# Patient Record
Sex: MALE | Race: WHITE | NOT HISPANIC OR LATINO | Employment: OTHER | URBAN - METROPOLITAN AREA
[De-identification: names, ages, dates, MRNs, and addresses within clinical notes are randomized per-mention and may not be internally consistent; named-entity substitution may affect disease eponyms.]

---

## 2023-10-10 ENCOUNTER — NURSING HOME VISIT (OUTPATIENT)
Dept: WOUND CARE | Facility: HOSPITAL | Age: 53
End: 2023-10-10
Payer: MEDICARE

## 2023-10-10 DIAGNOSIS — K81.9 CHOLECYSTITIS: Primary | ICD-10-CM

## 2023-10-10 PROCEDURE — 99304 1ST NF CARE SF/LOW MDM 25: CPT | Performed by: NURSE PRACTITIONER

## 2023-10-10 NOTE — PROGRESS NOTES
[facility]   [facility] [place]       Patient ID: Lorenzo Rosario is a 48 y.o. male Date of Birth 1970     Location of Service: [address] [place] [address] rehab    Performed wound round with: Wound team     Chief Complaint : Right abdomen    Wound Instructions:  Cholecystostomy  tube insertion site  Cleansed with no saline solution  Cover insertion site with split gauze  Daily and as needed for soiling  Monitor for any sign of infection    Allergies  Patient has no allergy information on record. Assessment & Plan:  1. Cholecystitis  Assessment & Plan: With cholecystostomy tube placement on 914 at the right upper quadrant. Incision site -no redness or obvious sign of infection  Continue split gauze  Continue to follow-up with surgeon  Sign off. Facility staff will continue to provide treatment and monitor the wound. Can reconsult wound nurse practitioner if wound failed to heal or worsened. Subjective:   October 10, 2023. New consult for surgical incision. Patient was referred by geriatric team.  Patient have a complex medical history including but not limited to hypertension, history of lumbar decompression and fusion and spastic left hemiparesis. Patient was seen with the facility wound team.    Wound history: As per medical record review, he was recently admitted on admitted 9/10 with increased weakness, falls, difficulty emptying his bladder. He then had a CT abdomen and pelvis which showed "Significant gallbladder distention, with gallbladder wall thickening and pericholecystic edema;  suspect fistulous connection between the gallbladder and the 2nd segment duodenum,  with few small calcified gallstones along the region of the gallbladder neck" felt  consistent with acute cholecystitis. Surgery was consulted who recommended RUQ US and treatment with percutaneous cholecystostomy tube placement by IR (placed 9/14) for given degree of  inflammation.   He completed course of IV Cipro from 9/10 - 9/18 and IV Flagyl 9/14 - 9/18. Received in bed, seems comfortable. Denies pain. No significant issues related to the surgical incision. Review of Systems   Constitutional: Negative. Respiratory: Negative. Cardiovascular: Negative. Skin: Positive for wound. Objective:    Physical Exam  Constitutional:       Appearance: Normal appearance. Cardiovascular:      Rate and Rhythm: Normal rate. Pulmonary:      Effort: Pulmonary effort is normal.   Skin:     Comments: Cholecystostomy tube on the right upper quadrant abdomen, secured, insertion site (negative redness),    Neurological:      Mental Status: He is alert. Procedures           Patient's care was coordinated with nursing facility staff. Recent vitals, labs and updated medications were reviewed on EMR or chart system of facility. Past Medical, surgical, social, medication and allergy history and patient's previous records were reviewed and updated as appropriate: Most up-to date information is available in the facility EMR where the patient is currently admitted. Past Medical History:  Diagnosis Date  • Allergic  • Ankle fracture, left 8/31/2016  Past Surgical History  Past Family History  Non-contributory to current clinical situation  Social History  Tobacco: none  Alcohol: none  Drugs: none  lives alone in a 1st floo         Coordination of Care: Wound team aware of the treatment plan. Facility nurse will provide wound treatment and monitor the wound for any changes. Patient / Staff education : Patient / Staff was given education on sign of infection and pressure ulcer prevention. Patient/ Staff verbalized understanding     Follow up :  Sign off  Voice-recognition software may have been used in the preparation of this document. Occasional wrong word or "sound-alike" substitutions may have occurred due to the inherent limitations of voice recognition software.  Interpretation should be guided by context.       GRACIE Dinh Che

## 2023-10-10 NOTE — ASSESSMENT & PLAN NOTE
With cholecystostomy tube placement on 914 at the right upper quadrant. Incision site -no redness or obvious sign of infection  Continue split gauze  Continue to follow-up with surgeon  Sign off. Facility staff will continue to provide treatment and monitor the wound. Can reconsult wound nurse practitioner if wound failed to heal or worsened.

## 2024-02-13 ENCOUNTER — NURSING HOME VISIT (OUTPATIENT)
Dept: WOUND CARE | Facility: HOSPITAL | Age: 54
End: 2024-02-13
Payer: MEDICARE

## 2024-02-13 DIAGNOSIS — R26.2 AMBULATORY DYSFUNCTION: ICD-10-CM

## 2024-02-13 DIAGNOSIS — T14.8XXA SURGICAL WOUND PRESENT: Primary | ICD-10-CM

## 2024-02-13 PROCEDURE — 99308 SBSQ NF CARE LOW MDM 20: CPT | Performed by: NURSE PRACTITIONER

## 2024-02-14 PROBLEM — T14.8XXA SURGICAL WOUND PRESENT: Status: ACTIVE | Noted: 2024-02-14

## 2024-02-14 NOTE — ASSESSMENT & PLAN NOTE
S/p  re-exploration of laparotomy, ileocolic anastomosis, fascial closure and PRACHI drain placement on 12/7.  -100% granulation, with no sign of infection  Local wound care with collagen  Continue to follow-up with surgeon  Follow-up next week

## 2024-02-14 NOTE — PROGRESS NOTES
"  [facility] WOUND CARE MANAGEMENT   AND HYPERBARIC MEDICINE CENTER       Patient ID: Ron Courtney is a 53 y.o. male Date of Birth 1970     [facility]    Performed wound round with: Wound team     Chief Complaint : abdomen    Wound Instructions:  Wound:  Abdomen  Discontinue previous wound order  Cleanse the wound bed with NSS   Apply non-sting skin prep to periwound area  Apply collagen wound dressing to wound bed, then cover with bordered foam  Frequency : daily  and prn for soiling  Offload all wounds  Turn and reposition frequently  Instruct / Assist with weight shifting in wheelchair  Increase protein intake.  Monitor for any sign of infection or worsening, inform PCP or patient's primary physician in your facility.      Allergies  Patient has no allergy information on record.      Assessment & Plan:  1. Surgical wound present  Assessment & Plan:  S/p  re-exploration of laparotomy, ileocolic anastomosis, fascial closure and PRACHI drain placement on 12/7.  -100% granulation, with no sign of infection  Local wound care with collagen  Continue to follow-up with surgeon  Follow-up next week        2. Ambulatory dysfunction               Subjective:   October 10, 2023.  New consult for surgical incision.  Patient was referred by geriatric team.  Patient have a complex medical history including but not limited to hypertension, history of lumbar decompression and fusion and spastic left hemiparesis.  Patient was seen with the facility wound team.    Wound history: As per medical record review, he was recently admitted on admitted 9/10 with increased weakness, falls, difficulty emptying his bladder.He then had a CT abdomen and pelvis which showed \"Significant gallbladder distention, with gallbladder wall thickening and pericholecystic edema;  suspect fistulous connection between the gallbladder and the 2nd segment duodenum,  with few small calcified gallstones along the region of the " "gallbladder neck\" felt  consistent with acute cholecystitis.  Surgery was consulted who recommended RUQ US and treatment with percutaneous cholecystostomy tube placement by IR (placed 9/14) for given degree of  inflammation.  He completed course of IV Cipro from 9/10 - 9/18 and IV Flagyl 9/14 - 9/18.    Received in bed, seems comfortable.  Denies pain.  No significant issues related to the surgical incision.    2/13/2024 Follow up for wound on the abdomen . Received patient, not in distress. Facility staff did not report any significant issues related to the wound. Patient was last seen on 10/10/23. As per staff report, the surgical wound on the abdomen dehisced. He was recently admitted at [facility] for elective laparoscopic cholecystectomy on 12/5. 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 Surgery was c/b bowel perforation with spillage, at which time the laparoscopy was converted to an open cholecystectomy with small bowel resection as well as resection of a portion of the right colon and an abthera placement. He underwent re-exploration of laparotomy, ileocolic anastomosis, fascial closure and PRACHI drain placement on 12/7. Due to persistent leukocytosis he underwent CT abdomen on 12/12 which revealed multiple intra-abdominal fluid collections concerning for abscesses with the  shunt noted to be traversing the left sided fluid collection. He underwent subsequent IR drain placement. 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 ID was following and managing IV ABX. On 12/16 he had a repeat CT A/P which revealed a focal collection of fluid surrounding the  shunt catheter in the mid left abdomen, as well as additional smaller fluid pockets in the right abdomen. A head CT for headaches showed an increase in hydrocephalus. Patient was transferred from [facility] to [facility] in order " to have his  shunt externalized. He has been followed closely by ID and NSGY. He is now s/p  shunt removal + EVD placement (12/20), EVD replacement (12/26), creation R ventriculoatrial shunt + EVD removal (1/9). 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         Review of Systems   Constitutional: Negative.    Respiratory: Negative.     Cardiovascular: Negative.    Skin:  Positive for wound.       Objective:    Physical Exam  Constitutional:       Appearance: Normal appearance.   Cardiovascular:      Rate and Rhythm: Normal rate.   Pulmonary:      Effort: Pulmonary effort is normal.   Skin:     Findings: Lesion present.      Comments: Abdomen : Wound size is 1.2 x 0.6 x 0.1 cm.,  100% granulation, small amount of serous drainage, periwound is normal, with no obvious sign of infection   Neurological:      Mental Status: He is alert.              Procedures           Patient's care was coordinated with nursing facility staff. Recent vitals, labs and updated medications were reviewed on EMR or chart system of facility. Past Medical, surgical, social, medication and allergy history and patient's previous records were reviewed and updated as appropriate: Most up-to date information is available in the facility EMR where the patient is currently admitted.    Past Medical History:  Diagnosis Date   Allergic   Ankle fracture, left 8/31/2016  Past Surgical History  Past Family History  Non-contributory to current clinical situation  Social History  Tobacco: none  Alcohol: none  Drugs: none  lives alone in a 1st floo         Coordination of Care: Wound team aware of the treatment plan. Facility nurse will provide wound treatment and monitor the wound for any changes.     Patient / Staff education : Patient / Staff was given education on sign of infection and pressure ulcer prevention. Patient/ Staff verbalized understanding     Follow up :  Sign  "off  Voice-recognition software may have been used in the preparation of this document. Occasional wrong word or \"sound-alike\" substitutions may have occurred due to the inherent limitations of voice recognition software. Interpretation should be guided by context.      GRACIE Ascencio  "

## 2024-02-20 ENCOUNTER — NURSING HOME VISIT (OUTPATIENT)
Dept: WOUND CARE | Facility: HOSPITAL | Age: 54
End: 2024-02-20
Payer: MEDICARE

## 2024-02-20 DIAGNOSIS — T14.8XXA SURGICAL WOUND PRESENT: Primary | ICD-10-CM

## 2024-02-20 PROCEDURE — 99308 SBSQ NF CARE LOW MDM 20: CPT | Performed by: NURSE PRACTITIONER

## 2024-02-22 NOTE — ASSESSMENT & PLAN NOTE
S/p  re-exploration of laparotomy, ileocolic anastomosis, fascial closure and PRACHI drain placement on 12/7.  - wound closed with one suture sticking out  - Facility to call the trauma surgeon office for appointment   -Sign off. Facility staff will continue to provide treatment and monitor the wound. Can reconsult wound nurse practitioner if wound failed to heal or worsened.

## 2024-02-22 NOTE — PROGRESS NOTES
"  [facility] WOUND CARE MANAGEMENT   AND HYPERBARIC MEDICINE CENTER       Patient ID: Ron Courtney is a 53 y.o. male Date of Birth 1970     [facility]    Performed wound round with: Wound team     Chief Complaint : abdomen    Wound Instructions:  Wound:  Abdomen  Discontinue previous wound order  Cleanse the wound bed with NSS   Apply non-sting skin prep to periwound area and wound bed then cover with bordered foam  Frequency : daily  and prn for soiling until seen by surgeon  Offload all wounds  Turn and reposition frequently  Instruct / Assist with weight shifting in wheelchair  Increase protein intake.  Monitor for any sign of infection or worsening, inform PCP or patient's primary physician in your facility.      Allergies  Patient has no allergy information on record.      Assessment & Plan:  1. Surgical wound present  Assessment & Plan:  S/p  re-exploration of laparotomy, ileocolic anastomosis, fascial closure and PRACHI drain placement on 12/7.  - wound closed with one suture sticking out  - Facility to call the trauma surgeon office for appointment   -Sign off. Facility staff will continue to provide treatment and monitor the wound. Can reconsult wound nurse practitioner if wound failed to heal or worsened.                      Subjective:   October 10, 2023.  New consult for surgical incision.  Patient was referred by geriatric team.  Patient have a complex medical history including but not limited to hypertension, history of lumbar decompression and fusion and spastic left hemiparesis.  Patient was seen with the facility wound team.    Wound history: As per medical record review, he was recently admitted on admitted 9/10 with increased weakness, falls, difficulty emptying his bladder.He then had a CT abdomen and pelvis which showed \"Significant gallbladder distention, with gallbladder wall thickening and pericholecystic edema;  suspect fistulous connection between the gallbladder " "and the 2nd segment duodenum,  with few small calcified gallstones along the region of the gallbladder neck\" felt  consistent with acute cholecystitis.  Surgery was consulted who recommended RUQ US and treatment with percutaneous cholecystostomy tube placement by IR (placed 9/14) for given degree of  inflammation.  He completed course of IV Cipro from 9/10 - 9/18 and IV Flagyl 9/14 - 9/18.    Received in bed, seems comfortable.  Denies pain.  No significant issues related to the surgical incision.    2/13/2024 Follow up for wound on the abdomen . Received patient, not in distress. Facility staff did not report any significant issues related to the wound. Patient was last seen on 10/10/23. As per staff report, the surgical wound on the abdomen dehisced. He was recently admitted at [facility] for elective laparoscopic cholecystectomy on 12/5. 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 Surgery was c/b bowel perforation with spillage, at which time the laparoscopy was converted to an open cholecystectomy with small bowel resection as well as resection of a portion of the right colon and an abthera placement. He underwent re-exploration of laparotomy, ileocolic anastomosis, fascial closure and PRACHI drain placement on 12/7. Due to persistent leukocytosis he underwent CT abdomen on 12/12 which revealed multiple intra-abdominal fluid collections concerning for abscesses with the  shunt noted to be traversing the left sided fluid collection. He underwent subsequent IR drain placement. 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 ID was following and managing IV ABX. On 12/16 he had a repeat CT A/P which revealed a focal collection of fluid surrounding the  shunt catheter in the mid left abdomen, as well as additional smaller fluid pockets in the right abdomen. A head CT for headaches " showed an increase in hydrocephalus. Patient was transferred from [facility] to [facility] in order to have his  shunt externalized. He has been followed closely by ID and NSGY. He is now s/p  shunt removal + EVD placement (12/20), EVD replacement (12/26), creation R ventriculoatrial shunt + EVD removal (1/9). 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     2/20/2024 Follow up for wound on the abdomen . Received patient, not in distress. Facility staff did not report any significant issues related to the wound. Denies pain.           Review of Systems   Constitutional: Negative.    Respiratory: Negative.     Cardiovascular: Negative.    Skin:  Positive for wound.       Objective:    Physical Exam  Constitutional:       Appearance: Normal appearance.   Cardiovascular:      Rate and Rhythm: Normal rate.   Pulmonary:      Effort: Pulmonary effort is normal.   Skin:     Findings: No lesion.      Comments: Abdomen : Wound is closed with one suture sticking out. No obvious sign of infection.   Neurological:      Mental Status: He is alert.              Procedures           Patient's care was coordinated with nursing facility staff. Recent vitals, labs and updated medications were reviewed on EMR or chart system of facility. Past Medical, surgical, social, medication and allergy history and patient's previous records were reviewed and updated as appropriate: Most up-to date information is available in the facility EMR where the patient is currently admitted.    Past Medical History:  Diagnosis Date   Allergic   Ankle fracture, left 8/31/2016  Past Surgical History  Past Family History  Non-contributory to current clinical situation  Social History  Tobacco: none  Alcohol: none  Drugs: none  lives alone in a 1st floo         Coordination of Care: Wound team aware of the treatment plan. Facility nurse will provide wound treatment and monitor the wound for any  "changes.     Patient / Staff education : Patient / Staff was given education on sign of infection and pressure ulcer prevention. Patient/ Staff verbalized understanding     Follow up :  Sign off  Voice-recognition software may have been used in the preparation of this document. Occasional wrong word or \"sound-alike\" substitutions may have occurred due to the inherent limitations of voice recognition software. Interpretation should be guided by context.      GRACIE Ascencio  "

## 2024-03-12 ENCOUNTER — NURSING HOME VISIT (OUTPATIENT)
Dept: WOUND CARE | Facility: HOSPITAL | Age: 54
End: 2024-03-12
Payer: MEDICARE

## 2024-03-12 DIAGNOSIS — L25.8 DERMATITIS ASSOCIATED WITH INCONTINENCE: Primary | ICD-10-CM

## 2024-03-12 DIAGNOSIS — R32 DERMATITIS ASSOCIATED WITH INCONTINENCE: Primary | ICD-10-CM

## 2024-03-12 PROCEDURE — 99307 SBSQ NF CARE SF MDM 10: CPT | Performed by: NURSE PRACTITIONER

## 2024-03-13 PROBLEM — L25.8 DERMATITIS ASSOCIATED WITH INCONTINENCE: Status: ACTIVE | Noted: 2024-03-13

## 2024-03-13 PROBLEM — R32 DERMATITIS ASSOCIATED WITH INCONTINENCE: Status: ACTIVE | Noted: 2024-03-13

## 2024-03-13 NOTE — ASSESSMENT & PLAN NOTE
Buttocks  Incontinent of bladder  Positive maceration, with no open wound  Local wound care with Z guard twice a day for moisture management  Continue to offload and provide timely continence care  Sign off. Facility staff will continue to provide treatment and monitor the wound. Can reconsult wound nurse practitioner if wound failed to heal or worsened.

## 2024-03-13 NOTE — PROGRESS NOTES
"  [facility] WOUND CARE MANAGEMENT   AND HYPERBARIC MEDICINE CENTER       Patient ID: Ron Courtney is a 53 y.o. male Date of Birth 1970     Location of Service: [facility]    Performed wound round with: Wound team     Chief Complaint : Buttocks    Wound Instructions:  Wound: Buttocks  Cleanse with soap and water, pat dry  Apply Z guard to buttocks  Twice a day and as needed for soiling  Offload all wounds  Turn and reposition frequently  Instruct / Assist with weight shifting in wheelchair  Increase protein intake.  Monitor for any sign of infection or worsening, inform PCP or patient's primary physician in your facility.      Allergies  Patient has no allergy information on record.      Assessment & Plan:  1. Dermatitis associated with incontinence  Assessment & Plan:  Buttocks  Incontinent of bladder  Positive maceration, with no open wound  Local wound care with Z guard twice a day for moisture management  Continue to offload and provide timely continence care  Sign off. Facility staff will continue to provide treatment and monitor the wound. Can reconsult wound nurse practitioner if wound failed to heal or worsened.                        Subjective:   October 10, 2023.  New consult for surgical incision.  Patient was referred by geriatric team.  Patient have a complex medical history including but not limited to hypertension, history of lumbar decompression and fusion and spastic left hemiparesis.  Patient was seen with the facility wound team.    Wound history: As per medical record review, he was recently admitted on admitted 9/10 with increased weakness, falls, difficulty emptying his bladder.He then had a CT abdomen and pelvis which showed \"Significant gallbladder distention, with gallbladder wall thickening and pericholecystic edema;  suspect fistulous connection between the gallbladder and the 2nd segment duodenum,  with few small calcified gallstones along the region of the gallbladder neck\" " felt  consistent with acute cholecystitis.  Surgery was consulted who recommended RUQ US and treatment with percutaneous cholecystostomy tube placement by IR (placed 9/14) for given degree of  inflammation.  He completed course of IV Cipro from 9/10 - 9/18 and IV Flagyl 9/14 - 9/18.    Received in bed, seems comfortable.  Denies pain.  No significant issues related to the surgical incision.    2/13/2024 Follow up for wound on the abdomen . Received patient, not in distress. Facility staff did not report any significant issues related to the wound. Patient was last seen on 10/10/23. As per staff report, the surgical wound on the abdomen dehisced. He was recently admitted at [facility] for elective laparoscopic cholecystectomy on 12/5. 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 Surgery was c/b bowel perforation with spillage, at which time the laparoscopy was converted to an open cholecystectomy with small bowel resection as well as resection of a portion of the right colon and an abthera placement. He underwent re-exploration of laparotomy, ileocolic anastomosis, fascial closure and PRACHI drain placement on 12/7. Due to persistent leukocytosis he underwent CT abdomen on 12/12 which revealed multiple intra-abdominal fluid collections concerning for abscesses with the  shunt noted to be traversing the left sided fluid collection. He underwent subsequent IR drain placement. 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 ID was following and managing IV ABX. On 12/16 he had a repeat CT A/P which revealed a focal collection of fluid surrounding the  shunt catheter in the mid left abdomen, as well as additional smaller fluid pockets in the right abdomen. A head CT for headaches showed an increase in hydrocephalus. Patient was transferred from [facility] to [facility] in order to have his   shunt externalized. He has been followed closely by ID and NSGY. He is now s/p  shunt removal + EVD placement (12/20), EVD replacement (12/26), creation R ventriculoatrial shunt + EVD removal (1/9). 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     2/20/2024 Follow up for wound on the abdomen . Received patient, not in distress. Facility staff did not report any significant issues related to the wound. Denies pain.     3/13/2024 consult for wound on the buttocks. Received patient, not in distress. Facility staff did not report any significant issues related to the wound.  As per report, patient was recently admitted in acute care and was discharged back to [facility] with maceration on the buttocks.          Review of Systems   Constitutional: Negative.    Respiratory: Negative.     Cardiovascular: Negative.    Skin:  Positive for wound.       Objective:    Physical Exam  Constitutional:       Appearance: Normal appearance.   Cardiovascular:      Rate and Rhythm: Normal rate.   Pulmonary:      Effort: Pulmonary effort is normal.   Skin:     Findings: Rash present. No lesion.      Comments: Buttocks: Positive maceration, with no open wound   Neurological:      Mental Status: He is alert.              Procedures           Patient's care was coordinated with nursing facility staff. Recent vitals, labs and updated medications were reviewed on EMR or chart system of facility. Past Medical, surgical, social, medication and allergy history and patient's previous records were reviewed and updated as appropriate: Most up-to date information is available in the facility EMR where the patient is currently admitted.    Past Medical History:  Diagnosis Date   Allergic   Ankle fracture, left 8/31/2016  Past Surgical History  Past Family History  Non-contributory to current clinical situation  Social History  Tobacco: none  Alcohol: none  Drugs: none  lives alone  "in a 1st floo         Coordination of Care: Wound team aware of the treatment plan. Facility nurse will provide wound treatment and monitor the wound for any changes.     Patient / Staff education : Patient / Staff was given education on sign of infection and pressure ulcer prevention. Patient/ Staff verbalized understanding     Follow up :  Sign off  Voice-recognition software may have been used in the preparation of this document. Occasional wrong word or \"sound-alike\" substitutions may have occurred due to the inherent limitations of voice recognition software. Interpretation should be guided by context.      GRACIE Ascencio  "

## 2024-04-22 NOTE — PROGRESS NOTES
Diagnoses and all orders for this visit:    Change in bowel function       Change in bowel function  Patient presents for evaluation of change in bowel function.  Forte large portion of his records are not available for me today.  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  He has undergone surgical resection for this to include cholecystectomy and ileocolectomy.  At some point time it appears he had an open abdomen.  Since she has been discharged and residing in a nursing facility he describes alternating bowel habits.  With this he has been on multiple different medication regimens.  These continue to alter his bowel habits such that he has sometimes formed bowel movements.  Sometimes loose bowel movements with urgency.  He notes that with these more urgent times, he feels to have a decreased sensation to have bowel movements.  With this he notes intermittent incontinence.  It is difficult to quantify this as it is variable from day-to-day.  He notes no prior episodes of this before his recent surgical adventures.  Examination is limited today as he is on a stretcher ambulatory.    From a strict anatomic description, and the patient who is postcholecystectomy post ileocolectomy would be expected to have some degree of altered bowel acid absorption.  This could predispose him to having more frequent loose bowel movements.  It appears that he has been started on a bile binder.  I would increase the dosing of this to twice a day.  I suspect that a regular bowel regimen will be more important in helping his control than anything else.  Pelvic floor physical therapy could be entertained.  Surgical therapy such as colostomy are not preferred by the patient.  The role of pelvic floor sacral nerve stimulator would be very questionable in this indication and its acuity.  He at some point in time will need surgical evaluation of his recent  anatomic changes.  Colonoscopy can be performed anytime at least 6 to 12 months after surgery for evaluation.  He notes he has had a negative stool test so this would be done more to evaluate his postsurgical anatomy and it would be for colorectal cancer screening.  I will follow-up with him as needed.      HPI    Ron Courtney is here for evaluation of fecal incontinence.     No past medical history on file.  No past surgical history on file.    Current Outpatient Medications:     albuterol (PROVENTIL HFA,VENTOLIN HFA) 90 mcg/act inhaler, Inhale 2 puffs every 6 (six) hours as needed for wheezing, Disp: , Rfl:     baclofen 10 mg tablet, , Disp: , Rfl:     bethanechol (URECHOLINE) 5 mg tablet, , Disp: , Rfl:     Cholecalciferol (VITAMIN D3) 1,000 units tablet, Take 2,000 Units by mouth daily, Disp: , Rfl:     Colesevelam HCl 3.75 g PACK, Take by mouth, Disp: , Rfl:     famotidine (PEPCID) 20 mg tablet, , Disp: , Rfl:     oxyCODONE (ROXICODONE) 5 immediate release tablet, , Disp: , Rfl:     potassium chloride (KLOR-CON) 20 mEq packet, , Disp: , Rfl:     terazosin (HYTRIN) 5 mg capsule, Take 5 mg by mouth, Disp: , Rfl:     tiZANidine (ZANAFLEX) 2 mg tablet, , Disp: , Rfl:     Ventolin  (90 Base) MCG/ACT inhaler, , Disp: , Rfl:     acetaminophen (TYLENOL) 325 mg tablet, Take 650 mg by mouth every 6 (six) hours as needed, Disp: , Rfl:     bisacodyl (DULCOLAX) 10 mg suppository, Insert 10 mg into the rectum daily as needed, Disp: , Rfl:     ondansetron (ZOFRAN) 4 mg tablet, Take 1 tablet as needed by oral route as directed., Disp: , Rfl:   Allergies as of 04/26/2024    (Not on File)     Review of Systems   Gastrointestinal:  Positive for constipation and diarrhea.   All other systems reviewed and are negative.    There were no vitals filed for this visit.  Physical Exam  Constitutional:       Appearance: Normal appearance.   Abdominal:      General: Abdomen is flat.      Palpations: Abdomen is soft.      Comments:  Portion of midline approxi-1 cm at the middle portion with eschar.   Skin:     General: Skin is warm and dry.   Neurological:      Mental Status: He is alert.      Comments: Resting comfortably on stretcher   Psychiatric:         Mood and Affect: Mood normal.         Behavior: Behavior normal.         Thought Content: Thought content normal.         Judgment: Judgment normal.

## 2024-04-26 ENCOUNTER — OFFICE VISIT (OUTPATIENT)
Age: 54
End: 2024-04-26
Payer: MEDICARE

## 2024-04-26 DIAGNOSIS — R19.8 CHANGE IN BOWEL FUNCTION: Primary | ICD-10-CM

## 2024-04-26 PROCEDURE — 99203 OFFICE O/P NEW LOW 30 MIN: CPT | Performed by: COLON & RECTAL SURGERY

## 2024-04-26 RX ORDER — POTASSIUM CHLORIDE 1.5 G/1.58G
POWDER, FOR SOLUTION ORAL
COMMUNITY
Start: 2024-04-09

## 2024-04-26 RX ORDER — TIZANIDINE 2 MG/1
TABLET ORAL
COMMUNITY
Start: 2024-04-05

## 2024-04-26 RX ORDER — BETHANECHOL CHLORIDE 5 MG
TABLET ORAL
COMMUNITY
Start: 2024-04-05

## 2024-04-26 RX ORDER — ALBUTEROL SULFATE 90 UG/1
2 AEROSOL, METERED RESPIRATORY (INHALATION) EVERY 6 HOURS PRN
COMMUNITY

## 2024-04-26 RX ORDER — ALBUTEROL SULFATE 90 UG/1
AEROSOL, METERED RESPIRATORY (INHALATION)
COMMUNITY
Start: 2024-03-08

## 2024-04-26 RX ORDER — ACETAMINOPHEN 325 MG/1
650 TABLET ORAL EVERY 6 HOURS PRN
COMMUNITY

## 2024-04-26 RX ORDER — FAMOTIDINE 20 MG/1
TABLET, FILM COATED ORAL
COMMUNITY
Start: 2024-04-03

## 2024-04-26 RX ORDER — COLESEVELAM HYDROCHLORIDE 3.75 G/1
POWDER, FOR SUSPENSION ORAL
COMMUNITY

## 2024-04-26 RX ORDER — BISACODYL 10 MG
10 SUPPOSITORY, RECTAL RECTAL DAILY PRN
COMMUNITY

## 2024-04-26 RX ORDER — OXYCODONE HYDROCHLORIDE 5 MG/1
TABLET ORAL
COMMUNITY
Start: 2024-03-21

## 2024-04-26 RX ORDER — TERAZOSIN 5 MG/1
5 CAPSULE ORAL
COMMUNITY
Start: 2024-02-07

## 2024-04-26 RX ORDER — BACLOFEN 10 MG/1
TABLET ORAL
COMMUNITY
Start: 2024-04-09

## 2024-04-26 RX ORDER — ONDANSETRON 4 MG/1
TABLET, FILM COATED ORAL
COMMUNITY

## 2024-04-26 NOTE — ASSESSMENT & PLAN NOTE
Patient presents for evaluation of change in bowel function.  Forte large portion of his records are not available for me today.  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  He has undergone surgical resection for this to include cholecystectomy and ileocolectomy.  At some point time it appears he had an open abdomen.  Since she has been discharged and residing in a nursing facility he describes alternating bowel habits.  With this he has been on multiple different medication regimens.  These continue to alter his bowel habits such that he has sometimes formed bowel movements.  Sometimes loose bowel movements with urgency.  He notes that with these more urgent times, he feels to have a decreased sensation to have bowel movements.  With this he notes intermittent incontinence.  It is difficult to quantify this as it is variable from day-to-day.  He notes no prior episodes of this before his recent surgical adventures.  Examination is limited today as he is on a stretcher ambulatory.    From a strict anatomic description, and the patient who is postcholecystectomy post ileocolectomy would be expected to have some degree of altered bowel acid absorption.  This could predispose him to having more frequent loose bowel movements.  It appears that he has been started on a bile binder.  I would increase the dosing of this to twice a day.  I suspect that a regular bowel regimen will be more important in helping his control than anything else.  Pelvic floor physical therapy could be entertained.  Surgical therapy such as colostomy are not preferred by the patient.  The role of pelvic floor sacral nerve stimulator would be very questionable in this indication and its acuity.  He at some point in time will need surgical evaluation of his recent anatomic changes.  Colonoscopy can be performed anytime at least 6 to 12 months after surgery for  evaluation.  He notes he has had a negative stool test so this would be done more to evaluate his postsurgical anatomy and it would be for colorectal cancer screening.  I will follow-up with him as needed.

## 2024-06-04 ENCOUNTER — NURSING HOME VISIT (OUTPATIENT)
Dept: WOUND CARE | Facility: HOSPITAL | Age: 54
End: 2024-06-04
Payer: MEDICARE

## 2024-06-04 DIAGNOSIS — R32 DERMATITIS ASSOCIATED WITH INCONTINENCE: Primary | ICD-10-CM

## 2024-06-04 DIAGNOSIS — L25.8 DERMATITIS ASSOCIATED WITH INCONTINENCE: Primary | ICD-10-CM

## 2024-06-04 PROCEDURE — 99307 SBSQ NF CARE SF MDM 10: CPT | Performed by: NURSE PRACTITIONER

## 2024-06-06 NOTE — PROGRESS NOTES
"  [facility] WOUND CARE MANAGEMENT   AND HYPERBARIC MEDICINE CENTER       Patient ID: Ron Courtney is a 53 y.o. male Date of Birth 1970     Location of Service: [facility]    Performed wound round with: Wound team     Chief Complaint : Buttocks    Wound Instructions:  Wound: Buttocks  Cleanse with soap and water, pat dry  Apply Z guard to buttocks  Twice a day and as needed for soiling  Offload all wounds  Turn and reposition frequently  Instruct / Assist with weight shifting in wheelchair  Increase protein intake.  Monitor for any sign of infection or worsening, inform PCP or patient's primary physician in your facility.      Allergies  Patient has no allergy information on record.      Assessment & Plan:  1. Dermatitis associated with incontinence  Assessment & Plan:  Buttocks  Incontinent of bladder  Positive excoriation  Local wound care with Z guard twice a day for moisture management  Continue to offload and provide timely continence care  Sign off. Facility staff will continue to provide treatment and monitor the wound. Can reconsult wound nurse practitioner if wound failed to heal or worsened.                      Subjective:   October 10, 2023.  New consult for surgical incision.  Patient was referred by geriatric team.  Patient have a complex medical history including but not limited to hypertension, history of lumbar decompression and fusion and spastic left hemiparesis.  Patient was seen with the facility wound team.    Wound history: As per medical record review, he was recently admitted on admitted 9/10 with increased weakness, falls, difficulty emptying his bladder.He then had a CT abdomen and pelvis which showed \"Significant gallbladder distention, with gallbladder wall thickening and pericholecystic edema;  suspect fistulous connection between the gallbladder and the 2nd segment duodenum,  with few small calcified gallstones along the region of the gallbladder neck\" felt  consistent with " acute cholecystitis.  Surgery was consulted who recommended RUQ US and treatment with percutaneous cholecystostomy tube placement by IR (placed 9/14) for given degree of  inflammation.  He completed course of IV Cipro from 9/10 - 9/18 and IV Flagyl 9/14 - 9/18.    Received in bed, seems comfortable.  Denies pain.  No significant issues related to the surgical incision.    2/13/2024 Follow up for wound on the abdomen . Received patient, not in distress. Facility staff did not report any significant issues related to the wound. Patient was last seen on 10/10/23. As per staff report, the surgical wound on the abdomen dehisced. He was recently admitted at [facility] for elective laparoscopic cholecystectomy on 12/5. 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 Surgery was c/b bowel perforation with spillage, at which time the laparoscopy was converted to an open cholecystectomy with small bowel resection as well as resection of a portion of the right colon and an abthera placement. He underwent re-exploration of laparotomy, ileocolic anastomosis, fascial closure and PRACHI drain placement on 12/7. Due to persistent leukocytosis he underwent CT abdomen on 12/12 which revealed multiple intra-abdominal fluid collections concerning for abscesses with the  shunt noted to be traversing the left sided fluid collection. He underwent subsequent IR drain placement. 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 ID was following and managing IV ABX. On 12/16 he had a repeat CT A/P which revealed a focal collection of fluid surrounding the  shunt catheter in the mid left abdomen, as well as additional smaller fluid pockets in the right abdomen. A head CT for headaches showed an increase in hydrocephalus. Patient was transferred from [facility] to [facility] in order to have his  shunt externalized. He  has been followed closely by ID and NSGY. He is now s/p  shunt removal + EVD placement (12/20), EVD replacement (12/26), creation R ventriculoatrial shunt + EVD removal (1/9). 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     2/20/2024 Follow up for wound on the abdomen . Received patient, not in distress. Facility staff did not report any significant issues related to the wound. Denies pain.     3/13/2024 consult for wound on the buttocks. Received patient, not in distress. Facility staff did not report any significant issues related to the wound.  As per report, patient was recently admitted in acute care and was discharged back to [facility] with maceration on the buttocks.    6/4/2024 Reconsult for wound on the buttocks. Received patient, not in distress. Facility staff did not report any significant issues related to the wound. Patient is incontinent of bowel.             Review of Systems   Constitutional: Negative.    Respiratory: Negative.     Cardiovascular: Negative.    Skin:  Positive for wound.       Objective:    Physical Exam  Constitutional:       Appearance: Normal appearance.   Cardiovascular:      Rate and Rhythm: Normal rate.   Pulmonary:      Effort: Pulmonary effort is normal.   Skin:     Findings: Rash present. No lesion.      Comments: Buttocks: + excoriation, dry, no open wound   Neurological:      Mental Status: He is alert.              Procedures           Patient's care was coordinated with nursing facility staff. Recent vitals, labs and updated medications were reviewed on EMR or chart system of facility. Past Medical, surgical, social, medication and allergy history and patient's previous records were reviewed and updated as appropriate: Most up-to date information is available in the facility EMR where the patient is currently admitted.    Past Medical History:  Diagnosis Date   Allergic   Ankle fracture, left  "8/31/2016  Past Surgical History  Past Family History  Non-contributory to current clinical situation  Social History  Tobacco: none  Alcohol: none  Drugs: none  lives alone in a 1st floo         Coordination of Care: Wound team aware of the treatment plan. Facility nurse will provide wound treatment and monitor the wound for any changes.     Patient / Staff education : Patient / Staff was given education on sign of infection and pressure ulcer prevention. Patient/ Staff verbalized understanding     Follow up :  Sign off  Voice-recognition software may have been used in the preparation of this document. Occasional wrong word or \"sound-alike\" substitutions may have occurred due to the inherent limitations of voice recognition software. Interpretation should be guided by context.      GRACIE Ascencio  "

## 2024-06-06 NOTE — ASSESSMENT & PLAN NOTE
Buttocks  Incontinent of bladder  Positive excoriation  Local wound care with Z guard twice a day for moisture management  Continue to offload and provide timely continence care  Sign off. Facility staff will continue to provide treatment and monitor the wound. Can reconsult wound nurse practitioner if wound failed to heal or worsened.

## 2025-04-03 ENCOUNTER — HOSPITAL ENCOUNTER (OUTPATIENT)
Dept: RADIOLOGY | Facility: HOSPITAL | Age: 55
Discharge: HOME | End: 2025-04-03
Payer: MEDICARE

## 2025-04-03 DIAGNOSIS — S24.154A OTHER INCOMPLETE LESION AT T11-T12 LEVEL OF THORACIC SPINAL CORD, INITIAL ENCOUNTER (HCC): ICD-10-CM

## 2025-04-03 PROCEDURE — 77080 DXA BONE DENSITY AXIAL: CPT
